# Patient Record
Sex: FEMALE | Race: WHITE | NOT HISPANIC OR LATINO | Employment: FULL TIME
[De-identification: names, ages, dates, MRNs, and addresses within clinical notes are randomized per-mention and may not be internally consistent; named-entity substitution may affect disease eponyms.]

---

## 2017-10-30 ENCOUNTER — RECORDS - HEALTHEAST (OUTPATIENT)
Dept: ADMINISTRATIVE | Facility: OTHER | Age: 57
End: 2017-10-30

## 2019-10-14 ENCOUNTER — RECORDS - HEALTHEAST (OUTPATIENT)
Dept: ADMINISTRATIVE | Facility: OTHER | Age: 59
End: 2019-10-14

## 2024-08-12 ENCOUNTER — OFFICE VISIT (OUTPATIENT)
Dept: FAMILY MEDICINE | Facility: CLINIC | Age: 64
End: 2024-08-12
Payer: COMMERCIAL

## 2024-08-12 VITALS
HEART RATE: 72 BPM | TEMPERATURE: 97.4 F | WEIGHT: 293 LBS | SYSTOLIC BLOOD PRESSURE: 124 MMHG | DIASTOLIC BLOOD PRESSURE: 72 MMHG | HEIGHT: 65 IN | RESPIRATION RATE: 20 BRPM | BODY MASS INDEX: 48.82 KG/M2 | OXYGEN SATURATION: 98 %

## 2024-08-12 DIAGNOSIS — I48.19 PERSISTENT ATRIAL FIBRILLATION (H): Primary | ICD-10-CM

## 2024-08-12 DIAGNOSIS — L24.9 ACUTE IRRITANT CONTACT DERMATITIS: ICD-10-CM

## 2024-08-12 DIAGNOSIS — K21.9 GASTROESOPHAGEAL REFLUX DISEASE WITHOUT ESOPHAGITIS: ICD-10-CM

## 2024-08-12 PROBLEM — Z86.16 HISTORY OF 2019 NOVEL CORONAVIRUS DISEASE (COVID-19): Status: ACTIVE | Noted: 2021-01-05

## 2024-08-12 PROBLEM — G47.33 OSA (OBSTRUCTIVE SLEEP APNEA): Status: ACTIVE | Noted: 2019-03-14

## 2024-08-12 PROBLEM — Z86.16 HISTORY OF 2019 NOVEL CORONAVIRUS DISEASE (COVID-19): Status: RESOLVED | Noted: 2021-01-05 | Resolved: 2024-08-12

## 2024-08-12 PROBLEM — S83.209A TORN MENISCUS: Status: ACTIVE | Noted: 2017-03-14

## 2024-08-12 PROBLEM — J45.21 MILD INTERMITTENT ASTHMA WITH ACUTE EXACERBATION: Status: ACTIVE | Noted: 2018-05-18

## 2024-08-12 PROBLEM — C02.9 TONGUE CANCER (H): Status: ACTIVE | Noted: 2020-02-04

## 2024-08-12 PROCEDURE — 99204 OFFICE O/P NEW MOD 45 MIN: CPT

## 2024-08-12 PROCEDURE — 36415 COLL VENOUS BLD VENIPUNCTURE: CPT

## 2024-08-12 PROCEDURE — 80048 BASIC METABOLIC PNL TOTAL CA: CPT

## 2024-08-12 RX ORDER — LOSARTAN POTASSIUM 50 MG/1
2 TABLET ORAL DAILY
COMMUNITY
Start: 2024-06-07

## 2024-08-12 RX ORDER — PANTOPRAZOLE SODIUM 40 MG/1
40 TABLET, DELAYED RELEASE ORAL EVERY EVENING
Qty: 90 TABLET | Refills: 3 | Status: SHIPPED | OUTPATIENT
Start: 2024-08-12

## 2024-08-12 RX ORDER — METOPROLOL SUCCINATE 25 MG/1
1 TABLET, EXTENDED RELEASE ORAL DAILY
COMMUNITY
Start: 2024-06-07

## 2024-08-12 RX ORDER — FUROSEMIDE 20 MG
40 TABLET ORAL DAILY
Qty: 180 TABLET | Refills: 0 | Status: SHIPPED | OUTPATIENT
Start: 2024-08-12

## 2024-08-12 RX ORDER — FUROSEMIDE 20 MG
40 TABLET ORAL
COMMUNITY
Start: 2024-08-12 | End: 2024-08-12

## 2024-08-12 ASSESSMENT — ANXIETY QUESTIONNAIRES
GAD7 TOTAL SCORE: 0
GAD7 TOTAL SCORE: 0
4. TROUBLE RELAXING: NOT AT ALL
5. BEING SO RESTLESS THAT IT IS HARD TO SIT STILL: NOT AT ALL
6. BECOMING EASILY ANNOYED OR IRRITABLE: NOT AT ALL
2. NOT BEING ABLE TO STOP OR CONTROL WORRYING: NOT AT ALL
GAD7 TOTAL SCORE: 0
IF YOU CHECKED OFF ANY PROBLEMS ON THIS QUESTIONNAIRE, HOW DIFFICULT HAVE THESE PROBLEMS MADE IT FOR YOU TO DO YOUR WORK, TAKE CARE OF THINGS AT HOME, OR GET ALONG WITH OTHER PEOPLE: NOT DIFFICULT AT ALL
7. FEELING AFRAID AS IF SOMETHING AWFUL MIGHT HAPPEN: NOT AT ALL
7. FEELING AFRAID AS IF SOMETHING AWFUL MIGHT HAPPEN: NOT AT ALL
3. WORRYING TOO MUCH ABOUT DIFFERENT THINGS: NOT AT ALL
8. IF YOU CHECKED OFF ANY PROBLEMS, HOW DIFFICULT HAVE THESE MADE IT FOR YOU TO DO YOUR WORK, TAKE CARE OF THINGS AT HOME, OR GET ALONG WITH OTHER PEOPLE?: NOT DIFFICULT AT ALL
1. FEELING NERVOUS, ANXIOUS, OR ON EDGE: NOT AT ALL

## 2024-08-12 ASSESSMENT — PAIN SCALES - GENERAL: PAINLEVEL: NO PAIN (0)

## 2024-08-12 ASSESSMENT — PATIENT HEALTH QUESTIONNAIRE - PHQ9
SUM OF ALL RESPONSES TO PHQ QUESTIONS 1-9: 0
10. IF YOU CHECKED OFF ANY PROBLEMS, HOW DIFFICULT HAVE THESE PROBLEMS MADE IT FOR YOU TO DO YOUR WORK, TAKE CARE OF THINGS AT HOME, OR GET ALONG WITH OTHER PEOPLE: NOT DIFFICULT AT ALL
SUM OF ALL RESPONSES TO PHQ QUESTIONS 1-9: 0

## 2024-08-12 NOTE — LETTER
August 13, 2024      Faby Irby  [address] [place] [address]        Dear ,    We are writing to inform you of your test results.    Sent results letter.   Basic metabolic panel- electrolytes within normal range. Kidney function tests (urea nitrogen, creatinine, GFR) show mild decline in kidney function from 5 years ago. Follow up with cardiology with furosemide dosage. Glucose is elevated at 125, goal is less than 99.    Resulted Orders   Basic metabolic panel   Result Value Ref Range    Sodium 138 135 - 145 mmol/L    Potassium 3.7 3.4 - 5.3 mmol/L    Chloride 102 98 - 107 mmol/L    Carbon Dioxide (CO2) 25 22 - 29 mmol/L    Anion Gap 11 7 - 15 mmol/L    Urea Nitrogen 25.9 (H) 8.0 - 23.0 mg/dL    Creatinine 1.35 (H) 0.51 - 0.95 mg/dL    GFR Estimate 44 (L) >60 mL/min/1.73m2      Comment:      eGFR calculated using 2021 CKD-EPI equation.    Calcium 9.2 8.8 - 10.4 mg/dL      Comment:      Reference intervals for this test were updated on 7/16/2024 to reflect our healthy population more accurately. There may be differences in the flagging of prior results with similar values performed with this method. Those prior results can be interpreted in the context of the updated reference intervals.    Glucose 125 (H) 70 - 99 mg/dL       If you have any questions or concerns, please call the clinic at the number listed above.       Sincerely,      ESTEFANIA Martines CNP

## 2024-08-12 NOTE — PROGRESS NOTES
"  Assessment & Plan     Persistent atrial fibrillation (H)  Persistent atrial fib managed with furosemide 40 mg daily, Eliquis 5 mg twice daily, Metroprolol 25 mg daily.  Cardiology usually manages medications.  Provided refill for 3 months until patient is able to follow-up with cardiology.  Will check electrolytes and kidney function today.  - furosemide (LASIX) 20 MG tablet; Take 2 tablets (40 mg) by mouth daily Take 2 tablets by mouth once daily.  - Basic metabolic panel    Gastroesophageal reflux disease without esophagitis  GERD symptoms managed with pantoprazole 40 mg daily.  Needs refill.  - pantoprazole (PROTONIX) 40 MG EC tablet; Take 1 tablet (40 mg) by mouth every evening    Acute irritant contact dermatitis  Continue to provide barrier between CPAP strap and skin.  Apply Aquaphor during the day.  Monitor for signs and symptoms of infection.    BMI  Estimated body mass index is 50.12 kg/m  as calculated from the following:    Height as of this encounter: 1.651 m (5' 5\").    Weight as of this encounter: 136.6 kg (301 lb 3.2 oz).     Subjective   Faby is a 64 year old, presenting for the following health issues:  Recheck Medication (Refills/) and Mass (Lump on the back of neck. Patient states that she believes it is from her sleep mask. Patient states that there is some soreness/irritation. Patient noticed it a couple of months ago. )      8/12/2024     5:20 PM   Additional Questions   Roomed by DWAYNE Levine    History of Present Illness       Reason for visit:  Perscription refill and sore on my neck    She eats 2-3 servings of fruits and vegetables daily.She consumes 0 sweetened beverage(s) daily.She exercises with enough effort to increase her heart rate 9 or less minutes per day.  She exercises with enough effort to increase her heart rate 3 or less days per week.   She is taking medications regularly.     Patient is a 64-year-old female presenting for medication management.  Medical " history includes HTN, intermittent asthma, history of tongue cancer, GERD, MACKENZIE, anxiety, depression, persistent atrial fib (1/2018), CAD, ascending aortic dilation.  Patient states she would like to establish primary care at [facility].    Patient follows cardiology for persistent A-fib, CAD, ascending aortic dilation.  A-fib status post DCCV 3/8/2018.  Intolerant to Sotalol-wheezing.  Flecainide started 5/2018 with failed attempt at DCCV 6/11/2018.  Flecainide discontinued.  Cardiac medications include furosemide 40 mg daily, Eliquis 5 mg twice daily, Metroprolol 25 mg daily, losartan 100 mg daily.    MACKENZIE with nightly use of CPAP.  Patient reports lower strap of CPAP mask has been rubbing against the nape of her neck causing irritation/rash.  Has been applying Neosporin to rash.  Denies itchiness, drainage, or spread of rash.  No fever or chills.    Patient inquired about weight loss medications.  She is interested in the GLP-1 medications.  She would like to discuss with cardiology first before starting the medications.  Has been struggling with weight for many years.  Discussed with patient referral can be placed to weight management clinic for medication weight loss.    Review of Systems  Review of systems negative otherwise known HPI.    No past medical history on file.    No past surgical history on file.    No family history on file.    Social History     Tobacco Use    Smoking status: Never     Passive exposure: Past    Smokeless tobacco: Never   Substance Use Topics    Alcohol use: Not on file     Current Outpatient Medications   Medication Sig Dispense Refill    albuterol (PROVENTIL HFA;VENTOLIN HFA) 90 mcg/actuation inhaler [ALBUTEROL (PROVENTIL HFA;VENTOLIN HFA) 90 MCG/ACTUATION INHALER] Inhale 2 puffs every 6 (six) hours as needed for wheezing.      apixaban ANTICOAGULANT (ELIQUIS ANTICOAGULANT) 5 MG tablet Take 1 tablet by mouth 2 times daily      citalopram (CELEXA) 20 MG tablet [CITALOPRAM  "(CELEXA) 20 MG TABLET] Take 20 mg by mouth every evening.      furosemide (LASIX) 20 MG tablet Take 2 tablets (40 mg) by mouth daily Take 2 tablets by mouth once daily. 180 tablet 0    ibuprofen (ADVIL,MOTRIN) 200 MG tablet [IBUPROFEN (ADVIL,MOTRIN) 200 MG TABLET] Take 200-400 mg by mouth every 6 (six) hours as needed for pain.      losartan (COZAAR) 50 MG tablet Take 2 tablets by mouth daily      metoprolol succinate ER (TOPROL XL) 25 MG 24 hr tablet Take 1 tablet by mouth daily      pantoprazole (PROTONIX) 40 MG EC tablet Take 1 tablet (40 mg) by mouth every evening 90 tablet 3     No current facility-administered medications for this visit.       Objective    /72 (BP Location: Left arm, Patient Position: Sitting, Cuff Size: Adult Large)   Pulse 72   Temp 97.4  F (36.3  C) (Temporal)   Resp 20   Ht 1.651 m (5' 5\")   Wt 136.6 kg (301 lb 3.2 oz)   SpO2 98%   BMI 50.12 kg/m    Body mass index is 50.12 kg/m .  Physical Exam   General: Alert, oriented, no acute distress.    Head: Normocephalic and atraumatic.   Eyes: Conjunctiva and sclera clear.   Respiratory: Lungs clear, unlabored. No rales, rhonchi, or wheezes.    Cardiovascular: A-fib. No murmurs. No peripheral edema.      Skin: Healing scab to base of neck.  No signs of infection.  Neurologic: No gross motor or sensory deficit. Mentation intact and speech normal.     Psychiatric: Appropriate affect.     Signed Electronically by: ESTEFANIA Martines CNP    "

## 2024-08-13 LAB
ANION GAP SERPL CALCULATED.3IONS-SCNC: 11 MMOL/L (ref 7–15)
BUN SERPL-MCNC: 25.9 MG/DL (ref 8–23)
CALCIUM SERPL-MCNC: 9.2 MG/DL (ref 8.8–10.4)
CHLORIDE SERPL-SCNC: 102 MMOL/L (ref 98–107)
CREAT SERPL-MCNC: 1.35 MG/DL (ref 0.51–0.95)
EGFRCR SERPLBLD CKD-EPI 2021: 44 ML/MIN/1.73M2
GLUCOSE SERPL-MCNC: 125 MG/DL (ref 70–99)
HCO3 SERPL-SCNC: 25 MMOL/L (ref 22–29)
POTASSIUM SERPL-SCNC: 3.7 MMOL/L (ref 3.4–5.3)
SODIUM SERPL-SCNC: 138 MMOL/L (ref 135–145)

## 2024-09-28 ENCOUNTER — HEALTH MAINTENANCE LETTER (OUTPATIENT)
Age: 64
End: 2024-09-28

## 2024-11-08 ENCOUNTER — MYC MEDICAL ADVICE (OUTPATIENT)
Dept: FAMILY MEDICINE | Facility: CLINIC | Age: 64
End: 2024-11-08

## 2024-11-08 NOTE — LETTER
January 27, 2025      Faby Irby  [address]  [facility] [address]        Dear Faby      In reviewing your records, we have identified a gap in your preventive services. Based on your age and health history, we recommend the following services.     Cervical cancer screening (PAP Smear)  and Annual Preventative Visit     Please call [phone] to schedule a cervical cancer screening (PAP smear)  and Please call [phone] to schedule an annual preventative visit     If you have already completed any of the above services please let me know the date the service was completed and where it was completed so we can request these records for your provider to review.     We believe that a strong preventive care program, including regular physical and follow-up healthcare appointments are an important part of a healthy lifestyle and we are committed to helping you maintain your health.     Thank you for choosing us as your health care provider.     Sincerely,      Caren Dinero, JUTSINN, RN  [facility]

## 2024-12-31 DIAGNOSIS — I48.19 PERSISTENT ATRIAL FIBRILLATION (H): ICD-10-CM

## 2024-12-31 RX ORDER — FUROSEMIDE 20 MG/1
40 TABLET ORAL DAILY
Qty: 60 TABLET | Refills: 0 | Status: SHIPPED | OUTPATIENT
Start: 2024-12-31

## 2025-01-02 NOTE — TELEPHONE ENCOUNTER
Left message for patient on identified voicemail stating medication was refilled, and that she will need physical/follow up for further refills. Will also send mychart.     Pooja MCCOY CMA on January 2, 2025 at 9:54 AM

## 2025-01-27 NOTE — TELEPHONE ENCOUNTER
Patient Quality Outreach    Patient is due for the following:   Cervical Cancer Screening - PAP Needed  Physical Preventive Adult Physical    Action(s) Taken:   Schedule a Adult Preventative    Type of outreach:    Sent Halldist message. and Sent letter.    Questions for provider review:    None           Caren Dinero RN

## 2025-08-09 ENCOUNTER — HEALTH MAINTENANCE LETTER (OUTPATIENT)
Age: 65
End: 2025-08-09

## 2025-08-13 DIAGNOSIS — K21.9 GASTROESOPHAGEAL REFLUX DISEASE WITHOUT ESOPHAGITIS: ICD-10-CM

## 2025-08-13 RX ORDER — PANTOPRAZOLE SODIUM 40 MG/1
40 TABLET, DELAYED RELEASE ORAL EVERY EVENING
Qty: 90 TABLET | Refills: 0 | Status: SHIPPED | OUTPATIENT
Start: 2025-08-13